# Patient Record
Sex: MALE | URBAN - METROPOLITAN AREA
[De-identification: names, ages, dates, MRNs, and addresses within clinical notes are randomized per-mention and may not be internally consistent; named-entity substitution may affect disease eponyms.]

---

## 2017-01-27 ENCOUNTER — OFFICE VISIT (OUTPATIENT)
Dept: PEDIATRICS | Facility: CLINIC | Age: 27
End: 2017-01-27
Payer: COMMERCIAL

## 2017-01-27 VITALS
DIASTOLIC BLOOD PRESSURE: 72 MMHG | HEART RATE: 78 BPM | SYSTOLIC BLOOD PRESSURE: 112 MMHG | TEMPERATURE: 97.5 F | WEIGHT: 210 LBS | BODY MASS INDEX: 27.83 KG/M2 | HEIGHT: 73 IN | OXYGEN SATURATION: 99 %

## 2017-01-27 DIAGNOSIS — H04.203 WATERY EYES: ICD-10-CM

## 2017-01-27 DIAGNOSIS — J30.2 SEASONAL ALLERGIC RHINITIS, UNSPECIFIED ALLERGIC RHINITIS TRIGGER: Primary | ICD-10-CM

## 2017-01-27 PROCEDURE — 99213 OFFICE O/P EST LOW 20 MIN: CPT | Performed by: PEDIATRICS

## 2017-01-27 RX ORDER — FEXOFENADINE HCL 180 MG/1
180 TABLET ORAL DAILY
Qty: 30 TABLET | Refills: 1 | COMMUNITY
Start: 2017-01-27

## 2017-01-27 RX ORDER — FLUTICASONE PROPIONATE 50 MCG
1-2 SPRAY, SUSPENSION (ML) NASAL DAILY
Qty: 1 BOTTLE | Refills: 11 | Status: CANCELLED | OUTPATIENT
Start: 2017-01-27

## 2017-01-27 NOTE — PROGRESS NOTES
"  SUBJECTIVE:                                                    Carlo Canales is a 26 year old male who presents to clinic today for the following health issues:    ALLERGIES      Duration: x 3.5 weeks    Description:   Nasal congestion: YES- in the AM  Sneezing: YES- in the AM  Red, itchy eyes: YES - this is patients worst symptoms.  He does have allergies to \"anything airborn\" - has summer allergies that are easily controlled with Allegra.  For past 3 weeks waking up with watery eyes.  Does not wear contacts.  NO fever/chills. No swimming, no chemical exposures.  Has tried cleaning the house thoroughly, has humidifier in room.  Has same symptoms in all environments (home, work, out of town).    Accompanying signs and symptoms: eye swelling    History (similar episodes/allergy testing): hx of hay fever allergies and allergic to dust, allergy testing done 1.5 years ago    Precipitating or alleviating factors: better in colder weather, recently travelled    Therapies tried and outcome: OTC Allegra with temporary relief, eye drops q 3 hours       Problem list and histories reviewed & adjusted, as indicated.  Additional history: as documented    Problem list, Medication list, Allergies, and Medical/Social/Surgical histories reviewed in [facility] and updated as appropriate.    ROS:  Constitutional, HEENT, cardiovascular, pulmonary, gi and gu systems are negative, except as otherwise noted.    OBJECTIVE:                                                    /72 mmHg  Pulse 78  Temp(Src) 97.5  F (36.4  C) (Oral)  Ht 6' 0.5\" (1.842 m)  Wt 210 lb (95.255 kg)  BMI 28.07 kg/m2  SpO2 99%  Body mass index is 28.07 kg/(m^2).  EYES: Eyes grossly normal to inspection, PERRL and conjunctivae and sclerae normal  HENT: ear canals and TM's normal and nose and mouth without ulcers or lesions  NECK: no adenopathy, no asymmetry, masses, or scars and thyroid normal to palpation    Diagnostic Test Results:  none  "     ASSESSMENT/PLAN:                                                            1. Seasonal allergic rhinitis, unspecified allergic rhinitis trigger  See PI    2. Watery eyes  Suspect this is actually a reaction from dry eyes.  Unsure as to etiology, claritza in all environments.  He will continue with humidifier in bedroom, and start using over the counter artificial tears in addition to the redness relief drops.  If not improving should see eye doctor to consider other dry eye pathology (sjogrens)      Patient Instructions   Recommend switching to zyrtec (cetirizine) - can get over the counter. Start with 10mg daily - can increase to twice a day if needed.    Start over the counter artifical tears - take along with the redness relief drops you are already using.    If symptoms not improving in 2 weeks, please see an eye doctor.        Deana Hsu MD  [facility]

## 2017-01-27 NOTE — MR AVS SNAPSHOT
After Visit Summary   1/27/2017    Carlo Canales    MRN: [id number]           Patient Information     Date Of Birth          1990        Visit Information        Provider Department      1/27/2017 10:00 AM Deana Hsu MD [facility]        Today's Diagnoses     Seasonal allergic rhinitis, unspecified allergic rhinitis trigger    -  1     Watery eyes           Care Instructions    Recommend switching to zyrtec (cetirizine) - can get over the counter. Start with 10mg daily - can increase to twice a day if needed.    Start over the counter artifical tears - take along with the redness relief drops you are already using.    If symptoms not improving in 2 weeks, please see an eye doctor.        Follow-ups after your visit        Who to contact     If you have questions or need follow up information about today's clinic visit or your schedule please contact [facility] directly at [phone].  Normal or non-critical lab and imaging results will be communicated to you by [employer], letter or phone within 4 business days after the clinic has received the results. If you do not hear from us within 7 days, please contact the clinic through [employer] or phone. If you have a critical or abnormal lab result, we will notify you by phone as soon as possible.  Submit refill requests through [employer] or call your pharmacy and they will forward the refill request to us. Please allow 3 business days for your refill to be completed.          Additional Information About Your Visit        MyChart Information     [employer] gives you secure access to your electronic health record. If you see a primary care provider, you can also send messages to your care team and make appointments. If you have questions, please call your primary care clinic.  If you do not have a primary care provider, please call [phone] and they will assist you.        Care EveryWhere ID     This is your Care  "EveryWhere ID. This could be used by other organizations to access your [facility] medical records  [id number]        Your Vitals Were     Pulse Temperature Height BMI (Body Mass Index) Pulse Oximetry       78 97.5  F (36.4  C) (Oral) 6' 0.5\" (1.842 m) 28.07 kg/m2 99%        Blood Pressure from Last 3 Encounters:   01/27/17 112/72   11/20/14 122/77   11/12/13 100/70    Weight from Last 3 Encounters:   01/27/17 210 lb (95.255 kg)   11/20/14 194 lb (87.998 kg)   11/12/13 201 lb 3.2 oz (91.264 kg)              Today, you had the following     No orders found for display         Today's Medication Changes          These changes are accurate as of: 1/27/17 10:23 AM.  If you have any questions, ask your nurse or doctor.               Stop taking these medicines if you haven't already. Please contact your care team if you have questions.     B-12 1000 MCG Tbcr   Stopped by:  Deana Hsu MD           vitamin D 400 UNITS capsule   Stopped by:  Deana Hsu MD                    Primary Care Provider Office Phone # Fax #    Gopal Gabino Irwin Wegener, [phone]       [address] [address]        Thank you!     Thank you for choosing [facility]  for your care. Our goal is always to provide you with excellent care. Hearing back from our patients is one way we can continue to improve our services. Please take a few minutes to complete the written survey that you may receive in the mail after your visit with us. Thank you!             Your Updated Medication List - Protect others around you: Learn how to safely use, store and throw away your medicines at www.disposemymeds.org.          This list is accurate as of: 1/27/17 10:23 AM.  Always use your most recent med list.                   Brand Name Dispense Instructions for use    fexofenadine 180 MG tablet    ALLEGRA    30 tablet    Take 1 tablet (180 mg) by mouth daily       Multi-vitamin Tabs " tablet     100 tablet    Take 1 tablet by mouth daily

## 2017-01-27 NOTE — NURSING NOTE
"Chief Complaint   Patient presents with     Allergies       Initial /72 mmHg  Pulse 78  Temp(Src) 97.5  F (36.4  C) (Oral)  Ht 6' 0.5\" (1.842 m)  Wt 210 lb (95.255 kg)  BMI 28.07 kg/m2  SpO2 99% Estimated body mass index is 28.07 kg/(m^2) as calculated from the following:    Height as of this encounter: 6' 0.5\" (1.842 m).    Weight as of this encounter: 210 lb (95.255 kg).  BP completed using cuff size: regular  "

## 2020-03-01 ENCOUNTER — HEALTH MAINTENANCE LETTER (OUTPATIENT)
Age: 30
End: 2020-03-01

## 2020-12-14 ENCOUNTER — HEALTH MAINTENANCE LETTER (OUTPATIENT)
Age: 30
End: 2020-12-14

## 2021-04-17 ENCOUNTER — HEALTH MAINTENANCE LETTER (OUTPATIENT)
Age: 31
End: 2021-04-17

## 2021-10-02 ENCOUNTER — HEALTH MAINTENANCE LETTER (OUTPATIENT)
Age: 31
End: 2021-10-02

## 2022-05-14 ENCOUNTER — HEALTH MAINTENANCE LETTER (OUTPATIENT)
Age: 32
End: 2022-05-14

## 2023-01-14 ENCOUNTER — HEALTH MAINTENANCE LETTER (OUTPATIENT)
Age: 33
End: 2023-01-14

## 2023-06-02 ENCOUNTER — HEALTH MAINTENANCE LETTER (OUTPATIENT)
Age: 33
End: 2023-06-02

## 2024-03-08 NOTE — PATIENT INSTRUCTIONS
Recommend switching to zyrtec (cetirizine) - can get over the counter. Start with 10mg daily - can increase to twice a day if needed.    Start over the counter artifical tears - take along with the redness relief drops you are already using.    If symptoms not improving in 2 weeks, please see an eye doctor.   ambulatory